# Patient Record
(demographics unavailable — no encounter records)

---

## 2024-12-24 NOTE — ASSESSMENT
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 Avery. There was concern raised also for left CCF which was not supported by further imaging.   She presents again today to discuss her symptoms which have become debilitating. There is pressure behind the L eye and in the L ear. She has seen multiple ENTs, was diagnosed with dysfunction of the L eustachian tube and tympanic membrane perforation, underwent tympanoplasty, and tube placement. None of this has helped her pain. Repeat MRA again showed the small distal cavernous L ICA aneurysm, and no concern for CCF.   I believe her symptoms are most likely related to sinus/ear issues, especially given her extensive history with multiple procedures. It's extremely unlikely that the small aneurysm is contributing to any symptoms, and there have been multiple studies that show no concern for CCF. However, since she keeps being told that CCF is a possibility, we discussed the utility of diagnostic cerebral angiogram to definitively rule this out. While I believe it's not fully indicated based on the existing imaging studies, since multiple ENT visits have been unhelpful and other physicians keep bringing it up, DSA is low enough risk in this young healthy patient where it's reasonable to proceed.    PLAN: - patient will again discuss with ENT and will f/u regarding whether she wants to proceed with formal catheter angiogram

## 2024-12-24 NOTE — REASON FOR VISIT
[Home] : at home, [unfilled] , at the time of the visit. [Medical Office: ([facility])___] : at the medical office located in  [Follow-Up: _____] : a [unfilled] follow-up visit [Verbal consent obtained from patient] : the patient, [unfilled]

## 2024-12-24 NOTE — REASON FOR VISIT
[Home] : at home, [unfilled] , at the time of the visit. [Medical Office: ([facility])___] : at the medical office located in  [Follow-Up: _____] : a [unfilled] follow-up visit [Verbal consent obtained from patient] : the patient, [unfilled]

## 2024-12-24 NOTE — HISTORY OF PRESENT ILLNESS
[FreeTextEntry1] : PAWEL JHA is a 423year old female with a history of anxiety,  Her Neurologist is Dr. Avery and manages her headaches/migraines. On 9/21/22 she presented with an increase in headaches, associated with nausea. On 10/29/22, she had brain imaging - MRI brain without contrast, MRA head/neck without contrast which showed a 2mm left cavernous ICA aneurysm, proximal and medial to the ophthalmic artery origin. She Ddnies known family history of cerebral aneurysms and she was never a smoker.   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 CTA head/neck obtained 5/18/23 with no asymmetric enhancement of cavernous sinuses, no dilation of either superior ophthalmic vein. Very low suspicion of CCF. Deferred angio for now unless symptoms arise.  Today, patient presents for a follow up phone visit to discuss results of repeat MRA brain done 8/8/2024. Stable 2 mm wide neck aneurysm projecting medially from the distal cavernous LICA, proximal and medial to the ophthalmic artery origin.  This extracranial aneurysm is unchanged since prior studies. [[No major neurologic changes since last visit. Denies new symptoms of motor, sensory, speech, or visual abnormalities. Denies symptoms with her eye, or double vision.

## 2025-02-12 NOTE — HISTORY OF PRESENT ILLNESS
[FreeTextEntry1] : PAWEL JHA is a 43year old female with a history of anxiety, Her Neurologist is Dr. Avery and manages her headaches/migraines. On 9/21/22 she presented with an increase in headaches, associated with nausea. On 10/29/22, she had brain imaging - MRI brain without contrast, MRA head/neck without contrast which showed a 2mm left cavernous ICA aneurysm, proximal and medial to the ophthalmic artery origin. She Ddnies known family history of cerebral aneurysms and she was never a smoker. 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 CTA head/neck obtained 5/18/23 with no asymmetric enhancement of cavernous sinuses, no dilation of either superior ophthalmic vein. Very low suspicion of CCF. Deferred angio for now unless symptoms arise.  2/12/25: pt arrives via TTM to discuss possible cerebral angiogram after consult with ENT.  Has new [facility] imaging. Complains of pressure on the left side of her face/jaw, ear pressure, tinnitus started Oxcarbazine with no relief, PT with no relief.  Following with ENT for possible treatment of hypoplastic area of sinus.     [facility] MRI brain w/wo of 1/28/25  Impression: Unremarkable.  vessel contacts the left proximal cisternal trigeminal nerve.  Severe sinus disease. [facility] MRV head non con of 1/28/25 Impression: No evidence of dural venous sinus of deep cerebral vein thrombosis. Unremarkable exam.    12/24/24: Today, patient presents for a follow up phone visit to discuss results of repeat MRA brain done 8/8/2024. Stable 2 mm wide neck aneurysm projecting medially from the distal cavernous LICA, proximal and medial to the ophthalmic artery origin. This extracranial aneurysm is unchanged since prior studies. [[No major neurologic changes since last visit. Denies new symptoms of motor, sensory, speech, or visual abnormalities. Denies symptoms with her eye, or double vision.  Dr. Ji [facility]

## 2025-02-12 NOTE — REASON FOR VISIT
[Home] : at home, [unfilled] , at the time of the visit. [Medical Office: ([facility])___] : at the medical office located in  [Telephone (audio)] : This telephonic visit was provided via audio only technology. [Verbal consent obtained from patient] : the patient, [unfilled]

## 2025-02-12 NOTE — ASSESSMENT
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 Avery. There was concern raised also for left CCF which was not supported by further imaging.   Today, 2/12/25, pt arrives via phone call to follow up after seeing a new neurologist and obtaining new imaging. Complains of pressure on the left side of her face/jaw and into the TMJ, ear pressure, and tinnitus. Saw Dr. Workman with [facility]  recently who obtained a new MRI with fine cut T2 which describes a vessel loop contacting the cisternal segment of the L trigeminal nerve, so she was started on Oxcarbazine recently. Also with pain on left side of head (occipital) and scalp taking Ubrelvy no relief. Following with ENT for hypoplastic area of sinus, had sinoplasty and chronic sinusitis.     [facility] MRI brain w/wo of 1/28/25  Impression: Unremarkable.  vessel contacts the left proximal cisternal trigeminal nerve.  Severe sinus disease. [facility] MRV head non con of 1/28/25 Impression: No evidence of dural venous sinus of deep cerebral vein thrombosis. Unremarkable exam. [facility] MRI TMJ 1/20/25 - There are findings compatible with bilateral anterior disc subluxation with recapture seen on the left side. Evaluation is limited on the right side secondary to severe motion artifact on the open-mouth sequence.   Her symptoms are not classic for trigeminal neuralgia, and with such extensive sinus, ear and Eustachian tube surgical history, there could be many other explanations for her pain. She also has findings on her TMJ MRI which admittedly I do not understand the significance of. She is in significant distress, but I explained that the vessel loop near the trigeminal nerve can just be incidental, and MVD surgery carries risks so we want to be sure this is the correct diagnosis before committing to surgery.    PLAN: Will speak with both Dr. Avery and Dr. Workman [place] [facility] to discuss if feel s/s related to trigeminal neuralgia  [phone]  ENT  Dr. Reji Petersen  [phone]

## 2025-02-12 NOTE — HISTORY OF PRESENT ILLNESS
[FreeTextEntry1] : PAWEL JHA is a 43year old female with a history of anxiety, Her Neurologist is Dr. Avery and manages her headaches/migraines. On 9/21/22 she presented with an increase in headaches, associated with nausea. On 10/29/22, she had brain imaging - MRI brain without contrast, MRA head/neck without contrast which showed a 2mm left cavernous ICA aneurysm, proximal and medial to the ophthalmic artery origin. She Ddnies known family history of cerebral aneurysms and she was never a smoker. 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 CTA head/neck obtained 5/18/23 with no asymmetric enhancement of cavernous sinuses, no dilation of either superior ophthalmic vein. Very low suspicion of CCF. Deferred angio for now unless symptoms arise.  2/12/25: pt arrives via TTM to discuss possible cerebral angiogram after consult with ENT.  Has new [facility] imaging. Complains of pressure on the left side of her face/jaw, ear pressure, tinnitus started Oxcarbazine with no relief, PT with no relief.  Following with ENT for possible treatment of hypoplastic area of sinus.     [facility] MRI brain w/wo of 1/28/25  Impression: Unremarkable.  vessel contacts the left proximal cisternal trigeminal nerve.  Severe sinus disease. [facility] MRV head non con of 1/28/25 Impression: No evidence of dural venous sinus of deep cerebral vein thrombosis. Unremarkable exam.    12/24/24: Today, patient presents for a follow up phone visit to discuss results of repeat MRA brain done 8/8/2024. Stable 2 mm wide neck aneurysm projecting medially from the distal cavernous LICA, proximal and medial to the ophthalmic artery origin. This extracranial aneurysm is unchanged since prior studies. [[No major neurologic changes since last visit. Denies new symptoms of motor, sensory, speech, or visual abnormalities. Denies symptoms with her eye, or double vision.  Dr. Ji [facility]

## 2025-02-12 NOTE — REASON FOR VISIT
[Home] : at home, [unfilled] , at the time of the visit. [Medical Office: ([facility])___] : at the medical office located in  [Telephone (audio)] : This telephonic visit was provided via audio only technology. [Verbal consent obtained from patient] : the patient, [unfilled]

## 2025-02-12 NOTE — ASSESSMENT
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 Avery. There was concern raised also for left CCF which was not supported by further imaging.   Today, 2/12/25, pt arrives via phone call to follow up after seeing a new neurologist and obtaining new imaging. Complains of pressure on the left side of her face/jaw and into the TMJ, ear pressure, and tinnitus. Saw Dr. Workman with [facility]  recently who obtained a new MRI with fine cut T2 which describes a vessel loop contacting the cisternal segment of the L trigeminal nerve, so she was started on Oxcarbazine recently. Also with pain on left side of head (occipital) and scalp taking Ubrelvy no relief. Following with ENT for hypoplastic area of sinus, had sinoplasty and chronic sinusitis.     [facility] MRI brain w/wo of 1/28/25  Impression: Unremarkable.  vessel contacts the left proximal cisternal trigeminal nerve.  Severe sinus disease. [facility] MRV head non con of 1/28/25 Impression: No evidence of dural venous sinus of deep cerebral vein thrombosis. Unremarkable exam. [facility] MRI TMJ 1/20/25 - There are findings compatible with bilateral anterior disc subluxation with recapture seen on the left side. Evaluation is limited on the right side secondary to severe motion artifact on the open-mouth sequence.   Her symptoms are not classic for trigeminal neuralgia, and with such extensive sinus, ear and Eustachian tube surgical history, there could be many other explanations for her pain. She also has findings on her TMJ MRI which admittedly I do not understand the significance of. She is in significant distress, but I explained that the vessel loop near the trigeminal nerve can just be incidental, and MVD surgery carries risks so we want to be sure this is the correct diagnosis before committing to surgery.    PLAN: Will speak with both Dr. Avery and Dr. Workman [place] [facility] to discuss if feel s/s related to trigeminal neuralgia  [phone]  ENT  Dr. Reji Petersen  [phone]